# Patient Record
Sex: MALE | Race: WHITE | NOT HISPANIC OR LATINO | URBAN - METROPOLITAN AREA
[De-identification: names, ages, dates, MRNs, and addresses within clinical notes are randomized per-mention and may not be internally consistent; named-entity substitution may affect disease eponyms.]

---

## 2021-02-05 ENCOUNTER — OFFICE VISIT CONVERTED (OUTPATIENT)
Dept: UROLOGY | Facility: CLINIC | Age: 41
End: 2021-02-05
Attending: UROLOGY

## 2021-05-14 VITALS — BODY MASS INDEX: 28.88 KG/M2 | WEIGHT: 225 LBS | RESPIRATION RATE: 16 BRPM | HEIGHT: 74 IN

## 2021-05-14 NOTE — PROGRESS NOTES
Progress Note      Patient Name: Myles Hill   Patient ID: [id number]   Sex: Male   YOB: 1980        Visit Date: February 5, 2021    Provider: Chino Gibbons MD   [place] [facility]   Location Address: [address] [place] [address]  [id number]   Location Phone: [phone]          Chief Complaint  · The patient is here for a vasectomy consultation.      History Of Present Illness  Myles Hill is here for counseling regarding vasectomy for permanent sterilization.   The procedure was discussed with the patient. Myles Hill is aware that the procedure should be considered irreversible, although at times it can be reversed with success. Risks for the procedure including local effects of selling, bleeding, pain, the possibility for recanalization, and continued fertility is also possible. Formation of a sperm granuloma also is a possibility. We discussed the procedure, preoperative preparation, and postoperative care. We also discussed the importance of continuing to use contraception post vasectomy, since the patient will not be sterile at that point. We stressed the importance of continuing to use contraception until a follow-up semen specimen is done that shows the absence of sperm. That specimen will normally be obtained eight weeks post-surgery.   Myles Hill is voluntarily requesting this procedure and understands that if it is successful he will be unable to father children.   He has no cardiopulmonary history, no prior scrotal surgery, no blood thinners.       Past Medical History  ***No Significant Medical History         Past Surgical History  *Denies any surgical procedures; Appendectomy; I have had no surgeries         Allergy List  NO KNOWN DRUG ALLERGIES; No known history of drug allergy         Family Medical History  Cancer, Unspecified; Diabetes, unspecified type; Prostate cancer         Social History  Alcohol (Current some day); Alcohol Use  "(Current some day); lives with spouse; .; Recreational Drug Use (Never); Tobacco (Never); Working         Review of Systems  · Constitutional  o Denies  o : fatigue, chills  · HENT  o Denies  o : headaches  · Cardiovascular  o Denies  o : chest pain  · Respiratory  o Denies  o : shortness of breath  · Gastrointestinal  o Denies  o : nausea, vomiting, diarrhea      Vitals  Date Time BP Position Site L\R Cuff Size HR RR TEMP (F) WT  HT  BMI kg/m2 BSA m2 O2 Sat FR L/min FiO2        02/05/2021 08:50 AM       16  224lbs 16oz 6'  2\" 28.89 2.31             Physical Examination  · Constitutional  o Appearance  o : well developed, well-nourished, in no acute distress.   · Neck  o Inspection/Palpation  o : normal appearance, no masses or tenderness, trachea midline  · Respiratory  o Respiratory Effort  o : breathing unlabored  o Auscultation of Lungs  o : clear to ascultation bilaterally  · Cardiovascular  o Heart  o :   § Auscultation of Heart  § : regular rate and rhythm, no murmurs  · Gastrointestinal  o Abdominal Examination  o : nontender, nondistended, no rigidity or gaurding,no hepatosplenomegaly  · Genitourinary  o Bladder  o : nonpalpable  o Penis  o : normal circumcised phallus with no masses or lesions  o Urethral Meatus  o : no inflammation present and no stenosis  o Scrotum and Scrotal Contents  o :   § Scrotum  § : bilateral vas were palpable  § Testes  § : descended testicles no masses or lesions  · Lymphatic  o Neck  o : no lymphadenopathy present, normal size  o Axilla  o : no lymphadenopathy present, normal size  o Groin  o : no lymphadenopathy present, normal size  · Neurologic  o Mental Status Examination  o :   § Orientation  § : grossly oriented to person, place and time, judgement and insight intact, normal mood and affect          Assessment  · Consultation for sterilization     V25.09/Z30.09      Plan  · Medications  o Medications have been Reconciled  o Transition of Care or Provider " Policy  · Instructions  o The patient will schedule a vasectomy if he desires.  o Handouts were provided, vasectomy  scanned into the EMR for reference.   o Vasectomy is intended to be a permanent form of contraception.  o Vasectomy does not produce immediate sterility.  o Following vasectomy, another form of contraception is required until vas occlusion is confirmed by post-vasectomy semen analysis (PVSA).  o Even after vas occlusion is confirmed, vasectomy is not 100% reliable in preventing pregnancy.  o The risk of pregnancy after vasectomy is approximately 1 in 2,000 for men who have no sperm in the semen on post-vasectomy semen analysis showing rare non-motile sperm (RNMS).  o Repeat vasectomy is necessary in < 1% of vasectomies, provided that a technique for vas occlusion known to have low occlusive failure rate has been used.   o Patient's should refrain from ejaculation for approximately 1 week after vasectomy.  o Options for fertility after vasectomy reversal and sperm retrival with in vitro fertilization. These options are not always successful, and are very expensive.   o The rates of surgical complications such as symptomatic hematoma and infection are 1-2%.  o Chronic scrotal pain associated with negative impact on quality of life occurs after vasectomy in about 1-2% of men. Few of these men require addtional surgery.   o Other permanent and non-permanent alternatives to vasectomy are avaliable.  o Patient voiced understanding of the above statements.  o Electronically Identified Patient Education Materials Provided Electronically            Electronically Signed by: Chino Gibbons MD -Author on February 5, 2021 09:21:45 AM

## 2021-09-08 ENCOUNTER — TRANSCRIBE ORDERS (OUTPATIENT)
Dept: LAB | Facility: HOSPITAL | Age: 41
End: 2021-09-08

## 2021-09-08 ENCOUNTER — LAB (OUTPATIENT)
Dept: LAB | Facility: HOSPITAL | Age: 41
End: 2021-09-08

## 2021-09-08 DIAGNOSIS — Z01.818 PRE-OP TESTING: Primary | ICD-10-CM

## 2021-09-08 DIAGNOSIS — Z01.818 PRE-OP TESTING: ICD-10-CM

## 2021-09-08 PROCEDURE — U0004 COV-19 TEST NON-CDC HGH THRU: HCPCS

## 2021-09-08 PROCEDURE — C9803 HOPD COVID-19 SPEC COLLECT: HCPCS

## 2021-09-10 LAB — SARS-COV-2 RNA NOSE QL NAA+PROBE: NOT DETECTED

## 2023-04-04 ENCOUNTER — PREP FOR SURGERY (OUTPATIENT)
Dept: OTHER | Facility: HOSPITAL | Age: 43
End: 2023-04-04
Payer: COMMERCIAL

## 2023-04-04 ENCOUNTER — OFFICE VISIT (OUTPATIENT)
Dept: SURGERY | Facility: CLINIC | Age: 43
End: 2023-04-04
Payer: COMMERCIAL

## 2023-04-04 VITALS — HEIGHT: 74 IN | BODY MASS INDEX: 27.93 KG/M2 | RESPIRATION RATE: 16 BRPM | WEIGHT: 217.6 LBS

## 2023-04-04 DIAGNOSIS — K40.90 RIGHT INGUINAL HERNIA: Primary | ICD-10-CM

## 2023-04-04 PROCEDURE — 99203 OFFICE O/P NEW LOW 30 MIN: CPT | Performed by: SURGERY

## 2023-04-04 RX ORDER — SODIUM CHLORIDE, SODIUM LACTATE, POTASSIUM CHLORIDE, CALCIUM CHLORIDE 600; 310; 30; 20 MG/100ML; MG/100ML; MG/100ML; MG/100ML
100 INJECTION, SOLUTION INTRAVENOUS CONTINUOUS
Status: CANCELLED | OUTPATIENT
Start: 2023-04-04

## 2023-04-04 RX ORDER — CEFAZOLIN SODIUM 2 G/100ML
2 INJECTION, SOLUTION INTRAVENOUS ONCE
Status: CANCELLED | OUTPATIENT
Start: 2023-04-04

## 2023-04-04 RX ORDER — SODIUM CHLORIDE 0.9 % (FLUSH) 0.9 %
10 SYRINGE (ML) INJECTION AS NEEDED
Status: CANCELLED | OUTPATIENT
Start: 2023-04-04

## 2023-04-04 RX ORDER — SODIUM CHLORIDE 9 MG/ML
40 INJECTION, SOLUTION INTRAVENOUS AS NEEDED
Status: CANCELLED | OUTPATIENT
Start: 2023-04-04

## 2023-04-04 RX ORDER — SODIUM CHLORIDE 0.9 % (FLUSH) 0.9 %
10 SYRINGE (ML) INJECTION EVERY 12 HOURS SCHEDULED
Status: CANCELLED | OUTPATIENT
Start: 2023-04-04

## 2023-04-04 NOTE — PROGRESS NOTES
"Chief Complaint: Hernia    Subjective         History of Present Illness  Myles Hill is a 42 y.o. male presents to [facility]. The patient is to be seen for an inguinal hernia.    Right inguinal hernia  The patient reports that he noticed a right inguinal hernia a few weeks ago while taking a shower. He denies any increase in size of the hernia. He denies any known associated injury. The patient states that the hernia feels hard. He notices that the hernia is less present when he is laying down. The patient denies taking any blood thinners at this time.    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 He was noted for an infection and was given antibiotics instead of an operation at the time. He reports that he had a drain in his side. He states that he \"left it\" and denies appendectomy because his appendix reportedly \"may or may not be there.\"      Objective     History reviewed. No pertinent past medical history.    No past surgical history on file.    No current outpatient medications on file.    No Known Allergies     Family History   Problem Relation Age of Onset   • Cancer Mother    • Early death Mother    • Cancer Father    • Diabetes Father    • Hypertension Father    • Diabetes Brother    • Diabetes Sister        Social History     Socioeconomic History   • Marital status:    Tobacco Use   • Smoking status: Never   • Smokeless tobacco: Never   Vaping Use   • Vaping Use: Never used   Substance and Sexual Activity   • Alcohol use: Yes     Alcohol/week: 1.0 standard drink     Types: 1 Cans of beer per week   • Drug use: Never   • Sexual activity: Yes     Partners: Female       Vital Signs:   Resp 16   Ht 188 cm (74\")   Wt 98.7 kg (217 lb 9.6 oz)   BMI 27.94 kg/m²      Physical Exam  Constitutional:       General: He is not in acute distress.     Appearance: Normal appearance. He is well-developed and normal " weight.   Cardiovascular:      Heart sounds: No murmur heard.  Pulmonary:      Effort: Pulmonary effort is normal.      Breath sounds: Normal air entry.   Abdominal:      Palpations: Abdomen is soft.      Hernia: A hernia is present. Hernia is present in the right inguinal area.   Genitourinary:     Comments: He has a reducible right inguinal hernia, minimally tender to palpation. There are no overlying skin changes.  Neurological:      Mental Status: He is alert and oriented to person, place, and time.      Motor: Motor function is intact.          Result Review :            Procedures        Assessment and Plan    There are no diagnoses linked to this encounter.     1. Patient with a symptomatic right inguinal hernia.    We will plan for a robotic inguinal hernia repair. The patient has elected for a piece of resorbable Phasix mesh. Risks, benefits, alternatives of the procedure were discussed extensively. All questions were answered. The patient voiced understanding and agreed to proceed with the above plan.      Follow Up   No follow-ups on file.  Patient was given instructions and counseling regarding his condition or for health maintenance advice. Please see specific information pulled into the AVS if appropriate.       Transcribed from ambient dictation for Elder Caceres MD by Ita Jimenez.  04/04/23   15:40 EDT    Patient or patient representative verbalized consent to the visit recording.  I have personally performed the services described in this document as transcribed by the above individual, and it is both accurate and complete.

## 2023-04-04 NOTE — H&P (VIEW-ONLY)
"Chief Complaint: Hernia    Subjective         History of Present Illness  Myles Hill is a 42 y.o. male presents to [facility]. The patient is to be seen for an inguinal hernia.    Right inguinal hernia  The patient reports that he noticed a right inguinal hernia a few weeks ago while taking a shower. He denies any increase in size of the hernia. He denies any known associated injury. The patient states that the hernia feels hard. He notices that the hernia is less present when he is laying down. The patient denies taking any blood thinners at this time.    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 He was noted for an infection and was given antibiotics instead of an operation at the time. He reports that he had a drain in his side. He states that he \"left it\" and denies appendectomy because his appendix reportedly \"may or may not be there.\"      Objective     History reviewed. No pertinent past medical history.    No past surgical history on file.    No current outpatient medications on file.    No Known Allergies     Family History   Problem Relation Age of Onset   • Cancer Mother    • Early death Mother    • Cancer Father    • Diabetes Father    • Hypertension Father    • Diabetes Brother    • Diabetes Sister        Social History     Socioeconomic History   • Marital status:    Tobacco Use   • Smoking status: Never   • Smokeless tobacco: Never   Vaping Use   • Vaping Use: Never used   Substance and Sexual Activity   • Alcohol use: Yes     Alcohol/week: 1.0 standard drink     Types: 1 Cans of beer per week   • Drug use: Never   • Sexual activity: Yes     Partners: Female       Vital Signs:   Resp 16   Ht 188 cm (74\")   Wt 98.7 kg (217 lb 9.6 oz)   BMI 27.94 kg/m²      Physical Exam  Constitutional:       General: He is not in acute distress.     Appearance: Normal appearance. He is well-developed and normal " weight.   Cardiovascular:      Heart sounds: No murmur heard.  Pulmonary:      Effort: Pulmonary effort is normal.      Breath sounds: Normal air entry.   Abdominal:      Palpations: Abdomen is soft.      Hernia: A hernia is present. Hernia is present in the right inguinal area.   Genitourinary:     Comments: He has a reducible right inguinal hernia, minimally tender to palpation. There are no overlying skin changes.  Neurological:      Mental Status: He is alert and oriented to person, place, and time.      Motor: Motor function is intact.          Result Review :            Procedures        Assessment and Plan    There are no diagnoses linked to this encounter.     1. Patient with a symptomatic right inguinal hernia.    We will plan for a robotic inguinal hernia repair. The patient has elected for a piece of resorbable Phasix mesh. Risks, benefits, alternatives of the procedure were discussed extensively. All questions were answered. The patient voiced understanding and agreed to proceed with the above plan.      Follow Up   No follow-ups on file.  Patient was given instructions and counseling regarding his condition or for health maintenance advice. Please see specific information pulled into the AVS if appropriate.       Transcribed from ambient dictation for Elder Caceres MD by Ita Jimenez.  04/04/23   15:40 EDT    Patient or patient representative verbalized consent to the visit recording.  I have personally performed the services described in this document as transcribed by the above individual, and it is both accurate and complete.

## 2023-04-18 ENCOUNTER — ANESTHESIA EVENT (OUTPATIENT)
Dept: PERIOP | Facility: HOSPITAL | Age: 43
End: 2023-04-18
Payer: COMMERCIAL

## 2023-04-18 ENCOUNTER — ANESTHESIA (OUTPATIENT)
Dept: PERIOP | Facility: HOSPITAL | Age: 43
End: 2023-04-18
Payer: COMMERCIAL

## 2023-04-18 ENCOUNTER — HOSPITAL ENCOUNTER (OUTPATIENT)
Facility: HOSPITAL | Age: 43
Setting detail: HOSPITAL OUTPATIENT SURGERY
Discharge: HOME | End: 2023-04-18
Attending: SURGERY | Admitting: SURGERY
Payer: COMMERCIAL

## 2023-04-18 VITALS
OXYGEN SATURATION: 98 % | DIASTOLIC BLOOD PRESSURE: 76 MMHG | HEIGHT: 74 IN | WEIGHT: 216.49 LBS | RESPIRATION RATE: 15 BRPM | BODY MASS INDEX: 27.78 KG/M2 | SYSTOLIC BLOOD PRESSURE: 113 MMHG | HEART RATE: 74 BPM | TEMPERATURE: 97.2 F

## 2023-04-18 DIAGNOSIS — K40.90 RIGHT INGUINAL HERNIA: ICD-10-CM

## 2023-04-18 PROCEDURE — 25010000002 MIDAZOLAM PER 1MG: Performed by: ANESTHESIOLOGY

## 2023-04-18 PROCEDURE — 0 HYDROMORPHONE 1 MG/ML SOLUTION: Performed by: NURSE ANESTHETIST, CERTIFIED REGISTERED

## 2023-04-18 PROCEDURE — C1781 MESH (IMPLANTABLE): HCPCS | Performed by: SURGERY

## 2023-04-18 PROCEDURE — 25010000002 PROPOFOL 10 MG/ML EMULSION: Performed by: NURSE ANESTHETIST, CERTIFIED REGISTERED

## 2023-04-18 PROCEDURE — 25010000002 ONDANSETRON PER 1 MG: Performed by: NURSE ANESTHETIST, CERTIFIED REGISTERED

## 2023-04-18 PROCEDURE — 25010000002 FENTANYL CITRATE (PF) 50 MCG/ML SOLUTION: Performed by: NURSE ANESTHETIST, CERTIFIED REGISTERED

## 2023-04-18 PROCEDURE — 0 LIDOCAINE 1 % SOLUTION 10 ML VIAL: Performed by: SURGERY

## 2023-04-18 PROCEDURE — 25010000002 DEXAMETHASONE PER 1 MG: Performed by: NURSE ANESTHETIST, CERTIFIED REGISTERED

## 2023-04-18 PROCEDURE — 25010000002 CEFAZOLIN IN DEXTROSE 2-4 GM/100ML-% SOLUTION: Performed by: SURGERY

## 2023-04-18 DEVICE — PHASIX MESH, 4" X 6" (10.2 CM X 15.2 CM), RECTANGLE
Type: IMPLANTABLE DEVICE | Site: INGUINAL | Status: FUNCTIONAL
Brand: PHASIX

## 2023-04-18 DEVICE — ABSORBABLE WOUND CLOSURE DEVICE
Type: IMPLANTABLE DEVICE | Site: INGUINAL | Status: FUNCTIONAL
Brand: V-LOC 180

## 2023-04-18 RX ORDER — SODIUM CHLORIDE 0.9 % (FLUSH) 0.9 %
10 SYRINGE (ML) INJECTION EVERY 12 HOURS SCHEDULED
Status: DISCONTINUED | OUTPATIENT
Start: 2023-04-18 | End: 2023-04-18 | Stop reason: HOSPADM

## 2023-04-18 RX ORDER — PROMETHAZINE HYDROCHLORIDE 12.5 MG/1
25 TABLET ORAL ONCE AS NEEDED
Status: DISCONTINUED | OUTPATIENT
Start: 2023-04-18 | End: 2023-04-18 | Stop reason: HOSPADM

## 2023-04-18 RX ORDER — ONDANSETRON 2 MG/ML
4 INJECTION INTRAMUSCULAR; INTRAVENOUS ONCE AS NEEDED
Status: DISCONTINUED | OUTPATIENT
Start: 2023-04-18 | End: 2023-04-18 | Stop reason: HOSPADM

## 2023-04-18 RX ORDER — ACETAMINOPHEN 500 MG
1000 TABLET ORAL ONCE
Status: COMPLETED | OUTPATIENT
Start: 2023-04-18 | End: 2023-04-18

## 2023-04-18 RX ORDER — SODIUM CHLORIDE, SODIUM LACTATE, POTASSIUM CHLORIDE, CALCIUM CHLORIDE 600; 310; 30; 20 MG/100ML; MG/100ML; MG/100ML; MG/100ML
9 INJECTION, SOLUTION INTRAVENOUS CONTINUOUS PRN
Status: DISCONTINUED | OUTPATIENT
Start: 2023-04-18 | End: 2023-04-18 | Stop reason: HOSPADM

## 2023-04-18 RX ORDER — DEXAMETHASONE SODIUM PHOSPHATE 4 MG/ML
INJECTION, SOLUTION INTRA-ARTICULAR; INTRALESIONAL; INTRAMUSCULAR; INTRAVENOUS; SOFT TISSUE AS NEEDED
Status: DISCONTINUED | OUTPATIENT
Start: 2023-04-18 | End: 2023-04-18 | Stop reason: SURG

## 2023-04-18 RX ORDER — MAGNESIUM HYDROXIDE 1200 MG/15ML
LIQUID ORAL AS NEEDED
Status: DISCONTINUED | OUTPATIENT
Start: 2023-04-18 | End: 2023-04-18 | Stop reason: HOSPADM

## 2023-04-18 RX ORDER — HYDROCODONE BITARTRATE AND ACETAMINOPHEN 5; 325 MG/1; MG/1
1 TABLET ORAL ONCE AS NEEDED
Status: DISCONTINUED | OUTPATIENT
Start: 2023-04-18 | End: 2023-04-18 | Stop reason: HOSPADM

## 2023-04-18 RX ORDER — SODIUM CHLORIDE 0.9 % (FLUSH) 0.9 %
10 SYRINGE (ML) INJECTION AS NEEDED
Status: DISCONTINUED | OUTPATIENT
Start: 2023-04-18 | End: 2023-04-18 | Stop reason: HOSPADM

## 2023-04-18 RX ORDER — SODIUM CHLORIDE 9 MG/ML
40 INJECTION, SOLUTION INTRAVENOUS AS NEEDED
Status: DISCONTINUED | OUTPATIENT
Start: 2023-04-18 | End: 2023-04-18 | Stop reason: HOSPADM

## 2023-04-18 RX ORDER — ROCURONIUM BROMIDE 10 MG/ML
INJECTION, SOLUTION INTRAVENOUS AS NEEDED
Status: DISCONTINUED | OUTPATIENT
Start: 2023-04-18 | End: 2023-04-18 | Stop reason: SURG

## 2023-04-18 RX ORDER — PROMETHAZINE HYDROCHLORIDE 25 MG/1
25 SUPPOSITORY RECTAL ONCE AS NEEDED
Status: DISCONTINUED | OUTPATIENT
Start: 2023-04-18 | End: 2023-04-18 | Stop reason: HOSPADM

## 2023-04-18 RX ORDER — HYDROCODONE BITARTRATE AND ACETAMINOPHEN 5; 325 MG/1; MG/1
1-2 TABLET ORAL EVERY 4 HOURS PRN
Qty: 20 TABLET | Refills: 0 | Status: SHIPPED | OUTPATIENT
Start: 2023-04-18

## 2023-04-18 RX ORDER — PROPOFOL 10 MG/ML
VIAL (ML) INTRAVENOUS AS NEEDED
Status: DISCONTINUED | OUTPATIENT
Start: 2023-04-18 | End: 2023-04-18 | Stop reason: SURG

## 2023-04-18 RX ORDER — MIDAZOLAM HYDROCHLORIDE 2 MG/2ML
2 INJECTION, SOLUTION INTRAMUSCULAR; INTRAVENOUS ONCE
Status: COMPLETED | OUTPATIENT
Start: 2023-04-18 | End: 2023-04-18

## 2023-04-18 RX ORDER — OXYCODONE HYDROCHLORIDE 5 MG/1
5 TABLET ORAL
Status: COMPLETED | OUTPATIENT
Start: 2023-04-18 | End: 2023-04-18

## 2023-04-18 RX ORDER — SODIUM CHLORIDE, SODIUM LACTATE, POTASSIUM CHLORIDE, CALCIUM CHLORIDE 600; 310; 30; 20 MG/100ML; MG/100ML; MG/100ML; MG/100ML
100 INJECTION, SOLUTION INTRAVENOUS CONTINUOUS
Status: DISCONTINUED | OUTPATIENT
Start: 2023-04-18 | End: 2023-04-18 | Stop reason: HOSPADM

## 2023-04-18 RX ORDER — ONDANSETRON 2 MG/ML
INJECTION INTRAMUSCULAR; INTRAVENOUS AS NEEDED
Status: DISCONTINUED | OUTPATIENT
Start: 2023-04-18 | End: 2023-04-18 | Stop reason: SURG

## 2023-04-18 RX ORDER — CEFAZOLIN SODIUM 2 G/100ML
2 INJECTION, SOLUTION INTRAVENOUS ONCE
Status: COMPLETED | OUTPATIENT
Start: 2023-04-18 | End: 2023-04-18

## 2023-04-18 RX ORDER — MEPERIDINE HYDROCHLORIDE 25 MG/ML
12.5 INJECTION INTRAMUSCULAR; INTRAVENOUS; SUBCUTANEOUS
Status: DISCONTINUED | OUTPATIENT
Start: 2023-04-18 | End: 2023-04-18 | Stop reason: HOSPADM

## 2023-04-18 RX ORDER — FENTANYL CITRATE 50 UG/ML
INJECTION, SOLUTION INTRAMUSCULAR; INTRAVENOUS AS NEEDED
Status: DISCONTINUED | OUTPATIENT
Start: 2023-04-18 | End: 2023-04-18 | Stop reason: SURG

## 2023-04-18 RX ORDER — DEXMEDETOMIDINE HYDROCHLORIDE 100 UG/ML
INJECTION, SOLUTION INTRAVENOUS AS NEEDED
Status: DISCONTINUED | OUTPATIENT
Start: 2023-04-18 | End: 2023-04-18 | Stop reason: SURG

## 2023-04-18 RX ORDER — LIDOCAINE HYDROCHLORIDE 20 MG/ML
INJECTION, SOLUTION EPIDURAL; INFILTRATION; INTRACAUDAL; PERINEURAL AS NEEDED
Status: DISCONTINUED | OUTPATIENT
Start: 2023-04-18 | End: 2023-04-18 | Stop reason: SURG

## 2023-04-18 RX ORDER — DOCUSATE SODIUM 100 MG/1
100 CAPSULE, LIQUID FILLED ORAL DAILY PRN
Qty: 10 CAPSULE | Refills: 1 | Status: SHIPPED | OUTPATIENT
Start: 2023-04-18 | End: 2024-04-17

## 2023-04-18 RX ADMIN — LIDOCAINE HYDROCHLORIDE 50 MG: 20 INJECTION, SOLUTION EPIDURAL; INFILTRATION; INTRACAUDAL; PERINEURAL at 07:25

## 2023-04-18 RX ADMIN — ONDANSETRON 4 MG: 2 INJECTION INTRAMUSCULAR; INTRAVENOUS at 07:52

## 2023-04-18 RX ADMIN — DEXMEDETOMIDINE HYDROCHLORIDE 20 MCG: 100 INJECTION, SOLUTION, CONCENTRATE INTRAVENOUS at 07:22

## 2023-04-18 RX ADMIN — ROCURONIUM BROMIDE 10 MG: 10 INJECTION, SOLUTION INTRAVENOUS at 08:00

## 2023-04-18 RX ADMIN — OXYCODONE HYDROCHLORIDE 5 MG: 5 TABLET ORAL at 09:52

## 2023-04-18 RX ADMIN — SODIUM CHLORIDE, POTASSIUM CHLORIDE, SODIUM LACTATE AND CALCIUM CHLORIDE 9 ML/HR: 600; 310; 30; 20 INJECTION, SOLUTION INTRAVENOUS at 06:51

## 2023-04-18 RX ADMIN — FENTANYL CITRATE 50 MCG: 50 INJECTION, SOLUTION INTRAMUSCULAR; INTRAVENOUS at 07:25

## 2023-04-18 RX ADMIN — HYDROMORPHONE HYDROCHLORIDE 0.5 MG: 1 INJECTION, SOLUTION INTRAMUSCULAR; INTRAVENOUS; SUBCUTANEOUS at 08:36

## 2023-04-18 RX ADMIN — ROCURONIUM BROMIDE 20 MG: 10 INJECTION, SOLUTION INTRAVENOUS at 07:39

## 2023-04-18 RX ADMIN — ROCURONIUM BROMIDE 50 MG: 10 INJECTION, SOLUTION INTRAVENOUS at 07:25

## 2023-04-18 RX ADMIN — MIDAZOLAM HYDROCHLORIDE 2 MG: 1 INJECTION, SOLUTION INTRAMUSCULAR; INTRAVENOUS at 07:06

## 2023-04-18 RX ADMIN — ACETAMINOPHEN 1000 MG: 500 TABLET ORAL at 06:51

## 2023-04-18 RX ADMIN — ROCURONIUM BROMIDE 10 MG: 10 INJECTION, SOLUTION INTRAVENOUS at 08:21

## 2023-04-18 RX ADMIN — OXYCODONE HYDROCHLORIDE 5 MG: 5 TABLET ORAL at 09:38

## 2023-04-18 RX ADMIN — FENTANYL CITRATE 50 MCG: 50 INJECTION, SOLUTION INTRAMUSCULAR; INTRAVENOUS at 07:39

## 2023-04-18 RX ADMIN — PROPOFOL 180 MG: 10 INJECTION, EMULSION INTRAVENOUS at 07:25

## 2023-04-18 RX ADMIN — CEFAZOLIN SODIUM 2 G: 2 INJECTION, SOLUTION INTRAVENOUS at 07:21

## 2023-04-18 RX ADMIN — HYDROMORPHONE HYDROCHLORIDE 0.5 MG: 1 INJECTION, SOLUTION INTRAMUSCULAR; INTRAVENOUS; SUBCUTANEOUS at 08:14

## 2023-04-18 RX ADMIN — SUGAMMADEX 200 MG: 100 INJECTION, SOLUTION INTRAVENOUS at 08:54

## 2023-04-18 RX ADMIN — DEXMEDETOMIDINE HYDROCHLORIDE 20 MCG: 100 INJECTION, SOLUTION, CONCENTRATE INTRAVENOUS at 07:26

## 2023-04-18 RX ADMIN — ONDANSETRON 4 MG: 2 INJECTION INTRAMUSCULAR; INTRAVENOUS at 09:38

## 2023-04-18 RX ADMIN — DEXAMETHASONE SODIUM PHOSPHATE 4 MG: 4 INJECTION, SOLUTION INTRA-ARTICULAR; INTRALESIONAL; INTRAMUSCULAR; INTRAVENOUS; SOFT TISSUE at 07:52

## 2023-04-18 NOTE — ANESTHESIA POSTPROCEDURE EVALUATION
Patient: Myles Hill    Procedure Summary     Date: 04/18/23 Room / Location: [facility] OR  / [facility]    Anesthesia Start: 0721 Anesthesia Stop: 0911    Procedure: INGUINAL HERNIA REPAIR LAPAROSCOPIC WITH DAVINCI ROBOT (Bilateral: Abdomen) Diagnosis:       Right inguinal hernia      (Right inguinal hernia [K40.90])    Surgeons: Elder Caceres MD Provider: Reyes, Mirabelle, DO    Anesthesia Type: general ASA Status: 1          Anesthesia Type: general    Vitals  Vitals Value Taken Time   /76 04/18/23 0959   Temp 36.3 °C (97.3 °F) 04/18/23 0908   Pulse 76 04/18/23 1007   Resp 18 04/18/23 0958   SpO2 97 % 04/18/23 1007   Vitals shown include unvalidated device data.        Post Anesthesia Care and Evaluation    Patient location during evaluation: bedside  Patient participation: complete - patient participated  Level of consciousness: awake  Pain management: adequate    Airway patency: patent  PONV Status: none  Cardiovascular status: acceptable and stable  Respiratory status: acceptable  Hydration status: acceptable    Comments: An Anesthesiologist personally participated in the most demanding procedures (including induction and emergence if applicable) in the anesthesia plan, monitored the course of anesthesia administration at frequent intervals and remained physically present and available for immediate diagnosis and treatment of emergencies.

## 2023-04-18 NOTE — DISCHARGE INSTRUCTIONS
DISCHARGE INSTRUCTIONS  HERNIA      For your surgery you had:  General anesthesia (you may have a sore throat for the first 24 hours)  IV sedation.  Local anesthesia  Monitored anesthesia care  You received a medicated patch for nausea prevention today (behind your ear). It is recommended that you remove it 24-48 hours post-operatively. It must be removed within 72 hours.   You received an anesthesia medication today that can cause hormonal forms of birth control to be ineffective. You should use a different form of birth control (to prevent pregnancy) for 7 days.  You may experience dizziness, drowsiness, or light-headedness for several hours following surgery/procedure.  Do not stay alone today or tonight.  Limit your activity for 24 hours.  Resume your diet slowly.  Follow whatever special dietary instructions you may have been given by your doctor.  You should not drive, operate machinery, drink alcohol, or sign legally binding documents for 24 hours or while you are taking pain medication.  Last dose of pain medication was given at:   .  NOTIFY YOUR DOCTOR IF YOU EXPERIENCE ANY OF THE FOLLOWING:  Temperature greater than 101 degrees Fahrenheit  Shaking Chills  Redness or excessive drainage from incision  Nausea, vomiting and/or pain that is not controlled by prescribed medications  Increase in bleeding or bleeding that is excessive  Unable to urinate in 6 hours after surgery  If unable to reach your doctor, please go to the closest Emergency Room [x] You may shower 4/19/2023    Apply an ice pack for 24-48 hours.  [x] Wear a jockey support or tight fitting briefs to prevent  swelling.  Do not do any heavy lifting, pushing or pulling.  You may walk up and down stairs.  You may ride in a car but do not drive until instructed by your physician.  Avoid constipation.  If unable to urinate in 6 to 8 hours after surgery or urinating frequently in small amounts, notify your doctor or go to the nearest Emergency  Room.  Medications per physician instructions as indicated on Discharge Medication Information Sheet.  You should see   for follow-up care   on  .  Phone number:       SPECIAL INSTRUCTIONS:                 I have read and received the above instructions.     Patient/Responsible Party's Signature Date/Time     RN Signature Date/Time

## 2023-04-18 NOTE — OP NOTE
Preoperative diagnosis: Right inguinal hernia    Postoperative diagnosis: Bilateral inguinal hernias    Procedure: Robotic bilateral inguinal hernia repair    Surgeon: Nicki    Anesthesia: General    Assistant: Gisella Mancuso    EBL: 7    Specimens: None    Complications: None    Indications: 42-year-old male who presented with symptomatic right inguinal hernia that seem to be enlarging.  He presents today for elective repair.  During the course of the operation was noted to have the beginning of a left inguinal hernia.  This was also repaired.    PROCEDURE    Patient was seen in the preoperative holding area.  Risks, benefits, alternatives of the operation were again discussed in detail.  All of the patient and family's questions were answered.  They voiced understanding, and agreed to proceed.    Patient was brought to the operating room.  Monitoring devices and Zac stockings were placed.  Anesthesia was administered.  Patient was prepped and draped in standard surgical fashion.  After prepping and draping a timeout was performed to verify both the correct patient and correct procedure.    We began by injecting local anesthesia in the left upper quadrant.  An incision to accommodate a 12 mm trocar was made in the left upper quadrant, and the Veress needle was inserted into the abdomen.  Intra-abdominal placement was verified with a normal saline drop test.  After verification, the abdomen was insufflated to 15 mmHg pressure.  Once the abdomen was insufflated, we removed the Veress needle.  Then using the Visiport technique the abdomen was entered in the left upper quadrant.  Once the abdomen was entered, we reinserted the laparoscope and looked underneath our Veress needle and Visiport entry sites, making sure there was no underlying injury.  We then went about placing our subsequent trochars.  After injection of local anesthesia and under direct visualization, an 8 mm trocar was placed in the left and right  mid abdomen and just above the umbilicus.  Upon inspection we noted that he had clear right inguinal hernia.  He was also noted to have a small left inguinal hernia.  Both were direct.    We then brought in and docked the robot.  Once the robot was in appropriate position, we identified the right anterior superior iliac spine and made an incision into the peritoneum at this level.  We sharply entered the preperitoneal space.  We allowed CO2 insufflation to aid in our dissection.  We dissected into the preperitoneal space with a combination of blunt dissection and electrocautery.  I dissected medially until I found the pubic tubercle.  We dissected about 2 cm below the level the pubic tubercle.  We then dissected medially to laterally.  During this dissection we reduced any contents in both the direct and indirect spaces.  Once the hernia sac was completely reduced and all contents were free of the direct and indirect space we finished our dissection out laterally to make space for the mesh.  We inspected our operative field and made sure breathing appeared to be hemostatic.  We did not appear to have any underlying injury.  Placed a 10 x 15 piece of phasix mesh into the preperitoneal space.  We made sure it covered past the midline and down below the pubic tubercle and our previous dissection field, we also made sure that it covered both the direct and indirect spaces completely with all reduced contents outside of the mesh.  Once we felt that the mesh was in appropriate position we closed the peritoneum over top of the mesh with a running 3 0 V-Loc suture.  We had a small hole in the peritoneum that was also closed with 3 oh V-Loc suture.  We again inspected our operative field and made sure everything appeared to be hemostatic with no obvious underlying injury.    We then turned our attention to the left.  We identified the anterior superior iliac spine and made an incision into the peritoneum at this level.  We  sharply entered the preperitoneal space.  We allowed CO2 insufflation to aid in our dissection.  We dissected into the preperitoneal space with a combination of blunt dissection and electrocautery.  I dissected medially until I found the pubic tubercle.  We dissected about 2 cm below the level the pubic tubercle.  We then dissected medially to laterally.  During this dissection we reduced any contents in both the direct and indirect spaces.  Once the hernia sac was completely reduced and all contents were free of the direct and indirect space we finished our dissection out laterally to make space for the mesh.  We inspected our operative field and made sure breathing appeared to be hemostatic.  We did not appear to have any underlying injury.  Placed a 10 x 15 piece of phasix mesh into the preperitoneal space.  We made sure it covered past the midline and down below the pubic tubercle and our previous dissection field, we also made sure that it covered both the direct and indirect spaces completely with all reduced contents outside of the mesh.  Once we felt that the mesh was in appropriate position we closed the peritoneum over top of the mesh with a running 3 0 V-Loc suture.  We again inspected our operative field and made sure everything appeared to be hemostatic with no obvious underlying injury.    We then removed the 12 mm trocar, and that site was closed with a Edwin Godinez device and 0 Vicryl suture under direct visualization.  The remaining trochars were then removed under direct visualization, and the abdomen was desufflated.  Skin incisions were closed with subcuticular 4-0 Vicryl stitch and dressed with skin glue.    The patient was then aroused anesthesia, and taken off the OR table, and taken to PACU in stable condition.  Sponge, needle, and instrument counts were correct x2.  Gisella Mancuso was present for the entire the procedure and helped in all portions of the case.    Assistant: Gisella Mancuso CSA  was responsible for performing the following activities: Suturing, Closing, Placing Dressing and Held/Positioned Camera and their skilled assistance was necessary for the success of this case.      The operative note was dictated with the help of the dragon dictation system.

## 2023-04-18 NOTE — ANESTHESIA PREPROCEDURE EVALUATION
Anesthesia Evaluation     Patient summary reviewed and Nursing notes reviewed   no history of anesthetic complications:  NPO Solid Status: > 8 hours  NPO Liquid Status: > 2 hours           Airway   Mallampati: I  TM distance: >3 FB  Neck ROM: full  No difficulty expected  Dental - normal exam     Pulmonary - negative pulmonary ROS and normal exam    breath sounds clear to auscultation  Cardiovascular - negative cardio ROS and normal exam  Exercise tolerance: good (4-7 METS)    Rhythm: regular  Rate: normal        Neuro/Psych- negative ROS  GI/Hepatic/Renal/Endo - negative ROS     Musculoskeletal (-) negative ROS    Abdominal    Substance History - negative use     OB/GYN negative ob/gyn ROS         Other - negative ROS       ROS/Med Hx Other: PAT Nursing Notes unavailable.                   Anesthesia Plan    ASA 1     general     (Patient understands anesthesia not responsible for dental damage.)  intravenous induction     Anesthetic plan, risks, benefits, and alternatives have been provided, discussed and informed consent has been obtained with: patient.  Pre-procedure education provided  Use of blood products discussed with patient  Consented to blood products.   Plan discussed with CRNA.        CODE STATUS:

## 2023-05-04 ENCOUNTER — OFFICE VISIT (OUTPATIENT)
Dept: SURGERY | Facility: CLINIC | Age: 43
End: 2023-05-04
Payer: COMMERCIAL

## 2023-05-04 VITALS — WEIGHT: 216 LBS | HEIGHT: 74 IN | RESPIRATION RATE: 16 BRPM | BODY MASS INDEX: 27.72 KG/M2

## 2023-05-04 DIAGNOSIS — K40.90 RIGHT INGUINAL HERNIA: Primary | ICD-10-CM

## 2023-05-04 NOTE — PROGRESS NOTES
Chief Complaint: Post-op Hernia (Inguinal hernia )    Subjective         History of Present Illness  Myles Hill is a 42 y.o. male presents to [facility]. The patient is to be seen for follow-up after robotic bilateral inguinal hernia repair.    Status post robotic bilateral inguinal hernia repair  The patient reports that he is doing well. He states that he still has some of the glue on the incisions, but it is not hurting that much. The patient reports that he is eating, drinking, urinating, and having normal bowel movements. He denies any bulging in his groin. The patient reports that it was a little tight on the 4th or 5th day after surgery, but not enough to where he could not stand it.      Objective     Past Medical History:   Diagnosis Date   • Inguinal hernia        Past Surgical History:   Procedure Laterality Date   • INGUINAL HERNIA REPAIR Bilateral 04/18/2023    Procedure: INGUINAL HERNIA REPAIR LAPAROSCOPIC WITH DAVINCI ROBOT;  Surgeon: Elder Caceres MD;  Location: [facility] OR [facility] [place];  Service: Robotics - DaVinci;  Laterality: Bilateral;   • WISDOM TOOTH EXTRACTION           Current Outpatient Medications:   •  docusate sodium (Colace) 100 MG capsule, Take 1 capsule by mouth Daily As Needed for Constipation., Disp: 10 capsule, Rfl: 1  •  HYDROcodone-acetaminophen (NORCO) 5-325 MG per tablet, Take 1-2 tablets by mouth Every 4 (Four) Hours As Needed (Pain)., Disp: 20 tablet, Rfl: 0    No Known Allergies     Family History   Problem Relation Age of Onset   • Cancer Mother    • Early death Mother    • Cancer Father    • Diabetes Father    • Hypertension Father    • Diabetes Sister    • Diabetes Brother    • Malig Hyperthermia Neg Hx        Social History     Socioeconomic History   • Marital status:    Tobacco Use   • Smoking status: Never   • Smokeless tobacco: Never   Vaping Use   • Vaping Use: Never used   Substance and Sexual Activity   • Alcohol use: Yes      Alcohol/week: 1.0 standard drink     Types: 1 Cans of beer per week     Comment: OCCASIONAL   • Drug use: Never   • Sexual activity: Yes     Partners: Female        Physical Exam   Post Surgical Incision  Surgical wound: Incision is healing well. No signs of recurrence of the hernia. No signs of infection.    No acute distress. Nonlabored breathing. Abdomen is soft.    Result Review :               Assessment and Plan    There are no diagnoses linked to this encounter.     1. Status post robotic bilateral inguinal hernia repair.  The patient is doing well, healing as expected, and pleased with his overall progress. At this point in time, he can follow up with me as needed and call with any questions or concerns. Discussed with the patient. All questions were answered. Patient voiced understanding and agree with the above plan.      Follow Up   No follow-ups on file.  Patient was given instructions and counseling regarding his condition or for health maintenance advice. Please see specific information pulled into the AVS if appropriate.       Transcribed from ambient dictation for Elder Caceres MD by Clara Long.  05/04/23   13:26 EDT    Patient or patient representative verbalized consent to the visit recording.  I have personally performed the services described in this document as transcribed by the above individual, and it is both accurate and complete.

## (undated) DEVICE — 3 RING SUTURE PASSER - 16 CM: Brand: 3 RING SUTURE PASSER - 16 CM

## (undated) DEVICE — INTENDED FOR TISSUE SEPARATION, AND OTHER PROCEDURES THAT REQUIRE A SHARP SURGICAL BLADE TO PUNCTURE OR CUT.: Brand: BARD-PARKER ® CARBON RIB-BACK BLADES

## (undated) DEVICE — DAVINCI-LF: Brand: MEDLINE INDUSTRIES, INC.

## (undated) DEVICE — STERILE POLYISOPRENE POWDER-FREE SURGICAL GLOVES WITH EMOLLIENT COATING: Brand: PROTEXIS

## (undated) DEVICE — ENDOPATH XCEL WITH OPTIVIEW TECHNOLOGY BLADELESS TROCARS WITH STABILITY SLEEVES: Brand: ENDOPATH XCEL OPTIVIEW

## (undated) DEVICE — CANNULA SEAL

## (undated) DEVICE — SUT VIC PLS CTD BR 0 TIE 18IN VIL

## (undated) DEVICE — ENDOPATH PNEUMONEEDLE INSUFFLATION NEEDLES WITH LUER LOCK CONNECTORS 120MM: Brand: ENDOPATH

## (undated) DEVICE — GLV SURG SENSICARE SLT PF LF 7.5 STRL

## (undated) DEVICE — APPL CHLORAPREP HI/LITE 26ML ORNG

## (undated) DEVICE — LAPAROVUE VISIBILITY SYSTEM LAPAROSCOPIC SOLUTIONS: Brand: LAPAROVUE

## (undated) DEVICE — SLV SCD KN/LEN ADJ EXPRSS BLENDED MD 1P/U

## (undated) DEVICE — ARM DRAPE

## (undated) DEVICE — TOTAL TRAY, 16FR 10ML SIL FOLEY, URN: Brand: MEDLINE

## (undated) DEVICE — DRSNG WND GZ CURAD OIL EMULSION 3X3IN STRL

## (undated) DEVICE — GLV SURG SENSICARE SLT PF LF 6.5 STRL

## (undated) DEVICE — SYR LL TP 10ML STRL

## (undated) DEVICE — TIP COVER ACCESSORY

## (undated) DEVICE — SOL IRR NACL 0.9PCT BT 1000ML

## (undated) DEVICE — ANTIBACTERIAL UNDYED BRAIDED (POLYGLACTIN 910), SYNTHETIC ABSORBABLE SUTURE: Brand: COATED VICRYL